# Patient Record
Sex: MALE | Race: WHITE | NOT HISPANIC OR LATINO | RURAL
[De-identification: names, ages, dates, MRNs, and addresses within clinical notes are randomized per-mention and may not be internally consistent; named-entity substitution may affect disease eponyms.]

---

## 2019-05-09 ENCOUNTER — CONVERSION ENCOUNTER (OUTPATIENT)
Dept: CARDIOLOGY | Facility: CLINIC | Age: 44
End: 2019-05-09

## 2019-05-09 ENCOUNTER — OFFICE VISIT CONVERTED (OUTPATIENT)
Dept: CARDIOLOGY | Facility: CLINIC | Age: 44
End: 2019-05-09
Attending: SPECIALIST

## 2019-05-31 ENCOUNTER — HOSPITAL ENCOUNTER (OUTPATIENT)
Dept: CARDIOLOGY | Facility: HOSPITAL | Age: 44
Discharge: HOME | End: 2019-05-31
Attending: SPECIALIST

## 2019-06-20 ENCOUNTER — CONVERSION ENCOUNTER (OUTPATIENT)
Dept: CARDIOLOGY | Facility: CLINIC | Age: 44
End: 2019-06-20

## 2019-06-20 ENCOUNTER — OFFICE VISIT CONVERTED (OUTPATIENT)
Dept: CARDIOLOGY | Facility: CLINIC | Age: 44
End: 2019-06-20
Attending: SPECIALIST

## 2020-07-30 ENCOUNTER — OFFICE VISIT CONVERTED (OUTPATIENT)
Dept: CARDIOLOGY | Facility: CLINIC | Age: 45
End: 2020-07-30
Attending: SPECIALIST

## 2021-05-13 NOTE — PROGRESS NOTES
"   Progress Note      Patient Name: Tejas Webb   Patient ID: [id number]   Sex: Male   YOB: 1975    Primary Care Provider: Pedro Pablo Walsh MD   Referring Provider: Pedro Pablo Walsh MD    Visit Date: July 30, 2020    Provider: Kwaku Lizarraga MD   Location: [facility]   Location Address: [address] [place] [address]  [id number]   Location Phone: [phone]          Chief Complaint  · Hypertension      History Of Present Illness  Tejas Webb is a 43 year old /White male with history of obstructive sleep apnea; wheelchair bound because of spin bifida. Denies any chest pain. No shortness of breath. Blood pressure 130/80 at home as per the patient and the family. No PND or orthopnea.   CURRENT MEDICATIONS: include Vit C 500 mg qd; Amlodipine 5 mg qd; Olmesartan 40 mg qd; Metoprolol Tartrate 100 mg qd; Calcium 500 mg + D qd; Cyanocobalamin 1000 mcg/ml inj.; Ferrous Sulfate 325 mg qd; Os-Darwin 500 + D3. The dosage and frequency of the medications were reviewed with the patient.   PAST MEDICAL HISTORY: positive for hypertension; spina bifida; sleep apnea.   FAMILY HISTORY: negative for diabetes and heart disease. Positive for hypertension.   PSYCHOSOCIAL HISTORY: positive for depression. Does not drink alcohol and does not smoke.       Review of Systems  · Cardiovascular  o Denies  o : palpitations (fast, fluttering, or skipping beats), swelling (feet, ankles, hands), shortness of breath while walking or lying flat, chest pain or angina pectoris   · Respiratory  o Denies  o : chronic or frequent cough, asthma or wheezing      Vitals  Date Time BP Position Site L\R Cuff Size HR RR TEMP (F) WT  HT  BMI kg/m2 BSA m2 O2 Sat HC       07/30/2020 02:53 /102 Sitting    86 - R   178lbs 0oz 5'  1\" 33.63 1.86           Physical Examination  · Constitutional  o Appearance  o : Awake, alert, cooperative, pleasant.  · Respiratory  o Inspection of Chest  o : No chest wall " deformities, moving equal.  o Auscultation of Lungs  o : Good air entry with vesicular breath sounds.  · Cardiovascular  o Heart  o :   § Auscultation of Heart  § : S1 and S2 regular. No S3. No S4. No murmurs.  o Peripheral Vascular System  o :   § Extremities  § : Peripheral pulses were well felt. No edema. No cyanosis.  · Gastrointestinal  o Abdominal Examination  o : No masses or organomegaly noted.          Assessment     IMPRESSION/PLAN    1. Essential hypertension better controlled. Continue current dose of Amlodipine. Blood pressure is high at home. I will add Losartan 50 mg qd. Be on a low salt diet. Follow up with his PMD.  2. He will see me back if he has any issues.     MD CODY Grande/wt       Plan  · Instructions  o This note was transcribed by Chelsie Samuels. CODY/wt  o The above service was transcribed by Chelsie Samuels on my behalf and I attest to the accuracy of the note. CODY            Electronically Signed by: Cinthya Samuels-, -Author on August 3, 2020 02:56:36 PM  Electronically Co-signed by: Kwaku Lizarraga MD -Reviewer on August 15, 2020 09:32:56 AM

## 2021-05-15 VITALS
HEART RATE: 86 BPM | WEIGHT: 178 LBS | DIASTOLIC BLOOD PRESSURE: 102 MMHG | BODY MASS INDEX: 33.61 KG/M2 | HEIGHT: 61 IN | SYSTOLIC BLOOD PRESSURE: 171 MMHG

## 2021-05-15 VITALS
HEART RATE: 76 BPM | SYSTOLIC BLOOD PRESSURE: 160 MMHG | BODY MASS INDEX: 31.74 KG/M2 | HEIGHT: 62 IN | WEIGHT: 172.5 LBS | DIASTOLIC BLOOD PRESSURE: 105 MMHG

## 2021-05-15 VITALS
BODY MASS INDEX: 31.74 KG/M2 | SYSTOLIC BLOOD PRESSURE: 129 MMHG | HEART RATE: 65 BPM | WEIGHT: 172.5 LBS | HEIGHT: 62 IN | DIASTOLIC BLOOD PRESSURE: 90 MMHG